# Patient Record
Sex: FEMALE | Race: WHITE | NOT HISPANIC OR LATINO | Employment: PART TIME | URBAN - METROPOLITAN AREA
[De-identification: names, ages, dates, MRNs, and addresses within clinical notes are randomized per-mention and may not be internally consistent; named-entity substitution may affect disease eponyms.]

---

## 2019-02-14 ENCOUNTER — APPOINTMENT (OUTPATIENT)
Dept: GENERAL RADIOLOGY | Facility: CLINIC | Age: 64
End: 2019-02-14
Attending: PHYSICIAN ASSISTANT

## 2019-02-14 ENCOUNTER — HOSPITAL ENCOUNTER (EMERGENCY)
Facility: CLINIC | Age: 64
Discharge: HOME | End: 2019-02-14
Attending: PHYSICIAN ASSISTANT | Admitting: PHYSICIAN ASSISTANT
Payer: COMMERCIAL

## 2019-02-14 VITALS
TEMPERATURE: 97.8 F | DIASTOLIC BLOOD PRESSURE: 75 MMHG | SYSTOLIC BLOOD PRESSURE: 110 MMHG | OXYGEN SATURATION: 98 % | RESPIRATION RATE: 15 BRPM

## 2019-02-14 DIAGNOSIS — M79.641 PAIN OF RIGHT HAND: ICD-10-CM

## 2019-02-14 PROCEDURE — G0463 HOSPITAL OUTPT CLINIC VISIT: HCPCS

## 2019-02-14 PROCEDURE — 73130 X-RAY EXAM OF HAND: CPT | Mod: RT

## 2019-02-14 PROCEDURE — 99213 OFFICE O/P EST LOW 20 MIN: CPT | Mod: Z6 | Performed by: PHYSICIAN ASSISTANT

## 2019-02-14 NOTE — ED AVS SNAPSHOT
[facility] Emergency Department  [address] [address]  Phone:  [phone]  Fax:  [phone]                                    Benjie Bob   MRN: [id number]    Department:  [facility] Emergency Department   Date of Visit:  2/14/2019           After Visit Summary Signature Page    I have received my discharge instructions, and my questions have been answered. I have discussed any challenges I see with this plan with the nurse or doctor.    ..........................................................................................................................................  Patient/Patient Representative Signature      ..........................................................................................................................................  Patient Representative Print Name and Relationship to Patient    ..................................................               ................................................  Date                                   Time    ..........................................................................................................................................  Reviewed by Signature/Title    ...................................................              ..............................................  Date                                               Time          22EPIC Rev 08/18

## 2019-02-14 NOTE — ED PROVIDER NOTES
History     Chief Complaint   Patient presents with     Hand Injury     right, jammed right hand when fell     HPI  Benjie Bob is a 63 year old female who presents to the urgent care with concern over right hand pain after injury after fall 2 days ago.  Patient sustained a fall on the ice while attempting to shovel.  She landed primarily on her buttock however did have impact on her posteriorly outstretched wrist.  She complains of pain, swelling, decreased range of motion of her right thumb.  She denies any distal numbness or paresthesias.  No other areas of significant pain.  She states that she has had progressively worsening pain of joints of fingers for the last several years that she attributes to arthritis.      Allergies:  Allergies   Allergen Reactions     Bees      Benadryl [Altaryl]      Benadryl [Diphenhydramine Hcl]      Codeine      Breathign problems     Codeine Sulfate      Percocet [Oxycodone-Acetaminophen]      Percocet [Oxycodone-Acetaminophen]      Vicodin [Acetaminophen] Hives     Vicodin [Hydrocodone-Acetaminophen]        Problem List:    Patient Active Problem List    Diagnosis Date Noted     MRSA (methicillin resistant staph aureus) culture positive 02/21/2013     Priority: Medium     Cellulitis of hand 10/15/2012     Priority: Medium        Past Medical History:    No past medical history on file.    Past Surgical History:    No past surgical history on file.    Family History:    No family history on file.    Social History:  Marital Status:   [4]  Social History     Tobacco Use     Smoking status: Never Smoker   Substance Use Topics     Alcohol use: No     Drug use: No        Medications:      LISINOPRIL 5 MG OR TABS   Venlafaxine HCl (EFFEXOR PO)     Review of Systems  INTEGUMENTARY/SKIN: POSITIVE for ecchymosis NEGATIVE for lacerations, abrasions or rashes   MUSCULOSKELETAL: POSITIVE  for right hand pain and NEGATIVE for other concerning arthralgias or myalgias    NEURO: NEGATIVE for numbness, paresthesias   Physical Exam   BP: 110/75  Heart Rate: 85  Temp: 97.8  F (36.6  C)  Resp: 15  SpO2: 98 %  Physical Exam   Constitutional: She is oriented to person, place, and time. She appears well-developed and well-nourished. No distress.   Cardiovascular:   Pulses:       Radial pulses are 2+ on the right side.   Musculoskeletal:        Right elbow: Normal.       Right wrist: She exhibits tenderness. She exhibits normal range of motion, no bony tenderness, no swelling, no effusion, no crepitus, no deformity and no laceration.        Right hand: She exhibits tenderness, bony tenderness and swelling. She exhibits normal range of motion (however there is pain with active movement of thumb at MCP joint due to pain), normal capillary refill, no deformity and no laceration.   Neurological: She is alert and oriented to person, place, and time. No sensory deficit. GCS eye subscore is 4. GCS verbal subscore is 5. GCS motor subscore is 6.   Skin: Skin is warm, dry and intact. Ecchymosis noted. No abrasion, no laceration and no rash noted. No erythema.     ED Course        Procedures        Critical Care time:  none        Results for orders placed or performed during the hospital encounter of 02/14/19   XR Hand Right G/E 3 Views    Narrative    RIGHT HAND THREE VIEWS   2/14/2019 1:53 PM     HISTORY: Pain of thumb and second metacarpal after fall two days ago.    COMPARISON: None.      Impression    IMPRESSION: No acute fracture or dislocation. There are severe  degenerative changes at the CMC joint of the thumb. Distal  interphalangeal degenerative changes are also noted across all  fingers.    KIM ARRINGTON MD     Medications - No data to display  Assessments & Plan (with Medical Decision Making)     I have reviewed the nursing notes.  I have reviewed the findings, diagnosis, plan and need for follow up with the patient.        Medication List      There are no discharge medications for  this visit.       Final diagnoses:   Pain of right hand     63-year-old female presents to the urgent care with concern over right hand pain after sustaining a fall on ice two days ago.  She had stable vital signs upon arrival.  Physical exam findings as described above.  As part of evaluation she did have x-ray of her right hand which did not demonstrate any acute fracture dislocation however was noted to have severe degenerative changes at the CMC joint of the thumb and degenerative changes across all fingers at the DIP joints.  She was notified of results of x-ray report.  Differential for her pain would include sprain, contusion, occult fracture. She was placed in thumb spica Velcro wrist splint.  May use symptomatic treatment with rest, ice, ibuprofen/tylenol as tolerated.  She was instructed to follow up for recheck in 3-5 days,  referral placed.  Worrisome reasons to return to ER/UC sooner discussed.      Disclaimer: This note consists of symbols derived from keyboarding, dictation, and/or voice recognition software. As a result, there may be errors in the script that have gone undetected.  Please consider this when interpreting information found in the chart.    2/14/2019   [facility] EMERGENCY DEPARTMENT     Marleni Schofield PA-C  02/18/19 [id number]

## 2020-11-01 ENCOUNTER — APPOINTMENT (OUTPATIENT)
Dept: GENERAL RADIOLOGY | Facility: CLINIC | Age: 65
End: 2020-11-01
Attending: NURSE PRACTITIONER
Payer: MEDICARE

## 2020-11-01 ENCOUNTER — HOSPITAL ENCOUNTER (EMERGENCY)
Facility: CLINIC | Age: 65
Discharge: HOME | End: 2020-11-01
Attending: NURSE PRACTITIONER | Admitting: NURSE PRACTITIONER
Payer: MEDICARE

## 2020-11-01 VITALS
OXYGEN SATURATION: 100 % | TEMPERATURE: 97.8 F | BODY MASS INDEX: 23.39 KG/M2 | HEART RATE: 78 BPM | WEIGHT: 137 LBS | DIASTOLIC BLOOD PRESSURE: 97 MMHG | SYSTOLIC BLOOD PRESSURE: 145 MMHG | HEIGHT: 64 IN | RESPIRATION RATE: 16 BRPM

## 2020-11-01 DIAGNOSIS — J06.9 VIRAL URI WITH COUGH: ICD-10-CM

## 2020-11-01 DIAGNOSIS — M79.645 THUMB PAIN, LEFT: ICD-10-CM

## 2020-11-01 PROCEDURE — 99285 EMERGENCY DEPT VISIT HI MDM: CPT | Mod: 25 | Performed by: NURSE PRACTITIONER

## 2020-11-01 PROCEDURE — 71045 X-RAY EXAM CHEST 1 VIEW: CPT

## 2020-11-01 PROCEDURE — U0003 INFECTIOUS AGENT DETECTION BY NUCLEIC ACID (DNA OR RNA); SEVERE ACUTE RESPIRATORY SYNDROME CORONAVIRUS 2 (SARS-COV-2) (CORONAVIRUS DISEASE [COVID-19]), AMPLIFIED PROBE TECHNIQUE, MAKING USE OF HIGH THROUGHPUT TECHNOLOGIES AS DESCRIBED BY CMS-2020-01-R: HCPCS | Performed by: NURSE PRACTITIONER

## 2020-11-01 PROCEDURE — 99284 EMERGENCY DEPT VISIT MOD MDM: CPT | Performed by: NURSE PRACTITIONER

## 2020-11-01 PROCEDURE — 73140 X-RAY EXAM OF FINGER(S): CPT | Mod: LT

## 2020-11-01 PROCEDURE — C9803 HOPD COVID-19 SPEC COLLECT: HCPCS | Performed by: NURSE PRACTITIONER

## 2020-11-01 ASSESSMENT — ENCOUNTER SYMPTOMS
NEUROLOGICAL NEGATIVE: 1
FEVER: 0
ABDOMINAL PAIN: 0
DIARRHEA: 0
CONSTIPATION: 0
NAUSEA: 0
CHILLS: 0
SHORTNESS OF BREATH: 0
CHEST TIGHTNESS: 0
SORE THROAT: 1
COUGH: 1
VOMITING: 0

## 2020-11-01 ASSESSMENT — MIFFLIN-ST. JEOR: SCORE: 1151.43

## 2020-11-01 NOTE — ED AVS SNAPSHOT
[facility] Emergency Dept  [address] [address]  Phone: [phone]  Fax: [phone]                                    Benjie Bob   MRN: [id number]    Department: [facility] Emergency Dept   Date of Visit: 11/1/2020           After Visit Summary Signature Page    I have received my discharge instructions, and my questions have been answered. I have discussed any challenges I see with this plan with the nurse or doctor.    ..........................................................................................................................................  Patient/Patient Representative Signature      ..........................................................................................................................................  Patient Representative Print Name and Relationship to Patient    ..................................................               ................................................  Date                                   Time    ..........................................................................................................................................  Reviewed by Signature/Title    ...................................................              ..............................................  Date                                               Time          22EPIC Rev 08/18

## 2020-11-01 NOTE — ED PROVIDER NOTES
History     Chief Complaint   Patient presents with     Cough     3 weeks of cough and congestion     Thumb Discomfort     hurt her thumb when tying something this morning      HPI  Benjie Bob is a 65 year old female who presents to the emergency department for evaluation of the following complaints:    Cough: started 2-3 weeks ago. Productive. Increased over the last week. Accompanied by sore throat and nasal congestion. Denies chest pain or shortness of breath.  Denies nausea or vomiting.  Denies constipation or diarrhea.  Denies altered taste or smell.  Denies decreased appetite. Requesting testing for Covid. Nonsmoker. No history of lung disease.    Left thumb injury:  This morning tying something and felt pain in the DIP joint. Unsure if she hyperextended her left thumb. Pain continues. No swelling or redness.     Allergies:  Allergies   Allergen Reactions     Bees      Benadryl [Altaryl]      Benadryl [Diphenhydramine Hcl]      Codeine      Breathign problems     Codeine Sulfate      Percocet [Oxycodone-Acetaminophen]      Percocet [Oxycodone-Acetaminophen]      Vicodin [Acetaminophen] Hives     Vicodin [Hydrocodone-Acetaminophen]        Problem List:    Patient Active Problem List    Diagnosis Date Noted     MRSA (methicillin resistant staph aureus) culture positive 02/21/2013     Priority: Medium     Cellulitis of hand 10/15/2012     Priority: Medium        Past Medical History:    No past medical history on file.    Past Surgical History:    No past surgical history on file.    Family History:    No family history on file.    Social History:  Marital Status:   [4]  Social History     Tobacco Use     Smoking status: Never Smoker   Substance Use Topics     Alcohol use: No     Drug use: No        Medications:         LISINOPRIL 5 MG OR TABS       Venlafaxine HCl (EFFEXOR PO)          Review of Systems   Constitutional: Negative for chills and fever.   HENT: Positive for congestion and sore  "throat.    Respiratory: Positive for cough. Negative for chest tightness and shortness of breath.    Cardiovascular: Negative for chest pain.   Gastrointestinal: Negative for abdominal pain, constipation, diarrhea, nausea and vomiting.   Musculoskeletal:        Left thumb pain   Skin: Negative.    Neurological: Negative.    All other systems reviewed and are negative.      Physical Exam   BP: (!) 145/97  Pulse: 78  Temp: 97.8  F (36.6  C)  Resp: 16  Height: 162.6 cm (5' 4\")  Weight: 62.1 kg (137 lb)  SpO2: 100 %      Physical Exam  Constitutional:       Appearance: Normal appearance.   HENT:      Head: Normocephalic and atraumatic.      Nose: Nose normal.      Mouth/Throat:      Mouth: Mucous membranes are moist.      Pharynx: Oropharynx is clear. Posterior oropharyngeal erythema present.   Eyes:      General: No scleral icterus.     Conjunctiva/sclera: Conjunctivae normal.   Cardiovascular:      Rate and Rhythm: Normal rate and regular rhythm.   Pulmonary:      Effort: Pulmonary effort is normal.      Breath sounds: Normal breath sounds.      Comments: Frequent wheezing sounding cough, but no wheezing on auscultation.  Musculoskeletal:      Comments: Left hand:   --no swelling, deformity or erythema.  --tenderness with palpation to the DIP joint of the left thumb  --normal sensation. Hand and fingers are warm and pink.     Skin:     General: Skin is warm and dry.   Neurological:      General: No focal deficit present.      Mental Status: She is alert and oriented to person, place, and time.         ED Course        Procedures       Results for orders placed or performed during the hospital encounter of 11/01/20 (from the past 24 hour(s))   XR Chest Port 1 View    Narrative    XR CHEST PORT 1 VW 11/1/2020 10:18 AM    HISTORY: cough    COMPARISON: None.      Impression    IMPRESSION: Negative chest.    JEAN CORTEZ MD   XR Finger Port Left G/E 2 Views    Narrative    FINGER PORTABLE LEFT TWO OR MORE VIEWS " 11/1/2020 10:23 AM    HISTORY: Hyperextended. DIP joint pain.    COMPARISON: None available      Impression    IMPRESSION: No acute left thumb fracture or dislocation. Severe left  thumb carpometacarpal joint osteoarthritis with moderate thumb MCP  joint osteoarthritis. Chronic fragmented spur thumb proximal  phalangeal base. No significant left thumb soft tissue swelling.     DONALD ASCENCIO MD       Medications - No data to display    Assessments & Plan (with Medical Decision Making)     65 year old female with 2 weeks of productive cough and congestion. No CP or SOA. No fevers. Additionally patient complains of left thumb pain after tying something today. Unsure if she hyperextended her thumb. Reports pain focal to the DIP joint.  On exam patient is afebrile. Cole sounds CTA. No tachypnea. No hypoxia. No respiratory distress. Left thumb is tender to the DIP joint, no swelling or erythema. CXR is normal. Left thumb xray is negative for acute fracture-notable for osteoarthritis. I discussed the imaging findings with patient. Covid-19 testing obaitned and result is pending. I have low suspicion for pneumonia. Likely a viral process and consistent with bronchitis- no wheezing on exam to warrant steroids. No indication for antibiotics. Patient has no history of lung disease or asthma. Plan  As follows:  Quarantine at home until you know the results of the covid test.   See handout.  Tylenol 650 mg every 4-6 hours as needed for fever/pain. (and for thumb pain)  Stay well hydrated.  Return to the emergency department for worsening fevers, chest pain, short of breath/difficulty breathing, or any new symptoms concern.      I have reviewed the nursing notes.    I have reviewed the findings, diagnosis, plan and need for follow up with the patient.      Discharge Medication List as of 11/1/2020 11:24 AM          Final diagnoses:   Viral URI with cough   Thumb pain, left       11/1/2020   [facility] EMERGENCY  DEPT     Vu, Bibiana Philip, KOKI ISRAEL  11/01/20 2741

## 2020-11-01 NOTE — DISCHARGE INSTRUCTIONS
Quarantine at home until you know the results of the covid test.   See handout.  Tylenol 650 mg every 4-6 hours as needed for fever/pain. (and for thumb pain)  Stay well hydrated.  Return to the emergency department for worsening fevers, chest pain, short of breath/difficulty breathing, or any new symptoms concern.

## 2020-11-02 ENCOUNTER — NURSE TRIAGE (OUTPATIENT)
Dept: NURSING | Facility: CLINIC | Age: 65
End: 2020-11-02

## 2020-11-02 LAB
SARS-COV-2 RNA SPEC QL NAA+PROBE: NOT DETECTED
SPECIMEN SOURCE: NORMAL

## 2020-11-02 NOTE — TELEPHONE ENCOUNTER
Triage call:   Patient calling to check on status of her results- advised on the following. Denies any symptoms today.      Coronavirus (COVID-19) Notification    Lab Result   Lab test 2019-nCoV rRt-PCR OR SARS-COV-2 PCR    Nasopharyngeal AND/OR Oropharyngeal swab is NEGATIVE for 2019-nCoV RNA [OR] SARS-COV-2 RNA (COVID-19) RNA    Your result was negative. This means that we didn't find the virus that causes COVID-19 in your sample. A test may show negative when you do actually have the virus. This can happen when the virus is in the early stages of infection, before you feel illness symptoms.    If you have symptoms   Stay home and away from others (self-isolate) until you meet ALL of the guidelines below:    You've had no fever--and no medicine that reduces fever--for 1 full day (24 hours). And      Your other symptoms have gotten better. For example, your cough or breathing has improved. And   ; At least 10 days have passed since your symptoms started. (If you've been told by a doctor that you have a weak immune system, wait 20 days.)         During this time:    Stay home. Don't go to work, school or anywhere else.     Stay in your own room, including for meals. Use your own bathroom if you can.    Stay away from others in your home. No hugging, kissing or shaking hands. No visitors.    Clean  high touch  surfaces often (doorknobs, counters, handles, etc.). Use a household cleaning spray or wipes. You can find a full list on the EPA website at www.epa.gov/pesticide-registration/list-n-disinfectants-use-against-sars-cov-2.    Cover your mouth and nose with a mask, tissue or other face covering to avoid spreading germs.    Wash your hands and face often with soap and water.    Going back to work  Check with your employer for any guidelines to follow for going back to work.  You are sent a letter for your Employer which will serve as formal document notice that you, the employee, tested negative for COVID-19, as of  the testing date shown above.    If your symptoms worsen or other concerning symptoms, contact PCP, oncare or consider returning to Emergency Dept.    Where can I get more information?     [place]: [email or web address]    Coronavirus Basics: www.health[facility].us/diseases/coronavirus/basics.html    [facility] Hotline (117-798-3886)    {Name]  Dora Bravo RN BSN 11/2/2020 2:51 PM

## 2024-02-01 ENCOUNTER — HOSPITAL ENCOUNTER (EMERGENCY)
Facility: CLINIC | Age: 69
Discharge: HOME | End: 2024-02-01
Attending: NURSE PRACTITIONER | Admitting: NURSE PRACTITIONER
Payer: MEDICARE

## 2024-02-01 VITALS
RESPIRATION RATE: 16 BRPM | OXYGEN SATURATION: 97 % | DIASTOLIC BLOOD PRESSURE: 74 MMHG | TEMPERATURE: 97.4 F | HEART RATE: 89 BPM | SYSTOLIC BLOOD PRESSURE: 121 MMHG

## 2024-02-01 DIAGNOSIS — L08.9 WOUND INFECTION: ICD-10-CM

## 2024-02-01 DIAGNOSIS — T14.8XXA WOUND INFECTION: ICD-10-CM

## 2024-02-01 PROCEDURE — 10060 I&D ABSCESS SIMPLE/SINGLE: CPT | Performed by: NURSE PRACTITIONER

## 2024-02-01 PROCEDURE — 87070 CULTURE OTHR SPECIMN AEROBIC: CPT | Performed by: NURSE PRACTITIONER

## 2024-02-01 PROCEDURE — G0463 HOSPITAL OUTPT CLINIC VISIT: HCPCS | Mod: 25 | Performed by: NURSE PRACTITIONER

## 2024-02-01 PROCEDURE — 99203 OFFICE O/P NEW LOW 30 MIN: CPT | Mod: 25 | Performed by: NURSE PRACTITIONER

## 2024-02-01 RX ORDER — SULFAMETHOXAZOLE/TRIMETHOPRIM 800-160 MG
1 TABLET ORAL 2 TIMES DAILY
Qty: 14 TABLET | Refills: 0 | Status: SHIPPED | OUTPATIENT
Start: 2024-02-01 | End: 2024-02-08

## 2024-02-01 NOTE — DISCHARGE INSTRUCTIONS
Recommend cleaning with antibacterial soap twice daily changing dressing twice daily ordered antibiotics recommend finishing all of these as ordered.  A culture was sent we will contact you if the antibiotics need to be changed.  I will be placing a podiatry consult and they will contact you.

## 2024-02-02 NOTE — RESULT ENCOUNTER NOTE
[facility] Emergency Dept discharge antibiotic prescribed: Sulfamethoxazole-Trimethoprim (Bactrim DS, Septra DS) 800-160 mg PO tablet,  1 tablet by mouth 2 times daily for 7 days.  Recommendations in treatment per [facility] ED Lab Result culture protocol

## 2024-02-03 NOTE — ED PROVIDER NOTES
ED Provider Note  [facility]      History     Chief Complaint   Patient presents with    Foot Problems     HPI  Benjie Bob is a 68 year old female who presents with pus filled blister on her left third toe.  Denies any fever, chills.  Reports that she has a sore between her right fourth and fifth toe that has become very painful.  Denies any history of diabetes.  Patient is seen by primary provider through [facility] clinics in [place].     Reports that she has had past infection with MRSA, treated with antibiotics.            Allergies:  Allergies   Allergen Reactions    Bees     Benadryl [Diphenhydramine Hcl]     Benadryl [Diphenhydramine Hcl]     Codeine Sulfate     Morphine And Related      Breathign problems    Percocet [Oxycodone-Acetaminophen]     Percocet [Oxycodone-Acetaminophen]     Vicodin [Acetaminophen] Hives    Vicodin [Hydrocodone-Acetaminophen]        Problem List:    Patient Active Problem List    Diagnosis Date Noted    MRSA (methicillin resistant staph aureus) culture positive 02/21/2013     Priority: Medium    Cellulitis of hand 10/15/2012     Priority: Medium        Past Medical History:    No past medical history on file.    Past Surgical History:    No past surgical history on file.    Family History:    No family history on file.    Social History:  Marital Status:   [4]  Social History     Tobacco Use    Smoking status: Never   Substance Use Topics    Alcohol use: No    Drug use: No        Medications:    LISINOPRIL 5 MG OR TABS  sulfamethoxazole-trimethoprim (BACTRIM DS) 800-160 MG tablet  Venlafaxine HCl (EFFEXOR PO)          Review of Systems  A medically appropriate review of systems was performed with pertinent positives and negatives noted in the HPI, and all other systems negative.    Physical Exam   No data found.       Physical Exam  General: alert and in no acute distress on arrival  Head: atraumatic, normocephalic  Lungs:  nonlabored,  CTA bilateral throughout  CV: Regular rate and rhythm, extremities warm and perfused, no edema in lower extremities.  Skin: no rashes, no diaphoresis and skin color normal, has a pus filled abscessed/ blister area approximately 3 cm round on top of her left third toe.  Also has a thickened red and white area between her fourth and fifth toe on her right foot which she reports is painful.  Skin around both of these is reddened and slightly thickened.  Neuro: Patient awake, alert, speech is fluent, appropriate historian.  Psychiatric: affect/mood normal, pleasant.      ED Course            Recommend cleaning with antibacterial soap twice daily changing dressing twice daily ordered antibiotics recommend finishing all of these as ordered.  A culture was sent we will contact you if the antibiotics need to be changed.  I will be placing a podiatry consult and they will contact you.     [facility]    PROCEDURE: -Incision/Drainage    Date/Time: 2/2/2024 8:41 PM    Performed by: Bisi Taylor APRN CNP  Authorized by: Bisi Taylor APRN CNP    Risks, benefits and alternatives discussed.      LOCATION:      Type:  Abscess    Size:  3 cm round    Location:  Lower extremity    Lower extremity location:  Foot    Foot location:  L foot    PRE-PROCEDURE DETAILS:     Skin preparation:  Antiseptic wash and Chloraprep    PROCEDURE TYPE:     Complexity:  Simple    ANESTHESIA (see MAR for exact dosages):     Anesthesia method:  Local infiltration    Local anesthetic:  Bupivacaine 0.25% w/o epi    PROCEDURE DETAILS:     Needle aspiration: yes      Needle size:  18 G    Wound management:  Probed and deloculated and irrigated with saline    Drainage:  Purulent and serosanguinous    Drainage amount:  Moderate    Wound treatment:  Wound left open    PROCEDURE  Describe Procedure: Needle aspiration of abscess bulla on left third toe, moderate amount of purulent serosanguineous drainage present this was  cultured and sent to the lab.  Patient Tolerance:  Patient tolerated the procedure well with no immediate complications                  No results found for this or any previous visit (from the past 24 hour(s)).    MEDICATIONS GIVEN IN THE EMERGENCY DEPARTMENT:  Medications - No data to display             Assessments & Plan (with Medical Decision Making)  68 year old female who presents to the Urgent Care for evaluation of wound infection of left third toe, ordered Bactrim DS twice daily for 7 days.  Ortho/podiatry  consult ordered.  Patient advised that they will contact her to schedule instructed to finish all of oral antibiotics ordered, also advised that she may be contacted regarding culture results if antibiotic needs to be changed.  Chart reviewed she has a history of MRSA, Bactrim DS will treat MRSA however Gram stain and culture are pending.       I have reviewed the nursing notes.    I have reviewed the findings, diagnosis, plan and need for follow up with the patient.        NEW PRESCRIPTIONS STARTED AT TODAY'S ER VISIT  Discharge Medication List as of 2/1/2024  4:35 PM        START taking these medications    Details   sulfamethoxazole-trimethoprim (BACTRIM DS) 800-160 MG tablet Take 1 tablet by mouth 2 times daily for 7 days, Disp-14 tablet, R-0, E-Prescribe             Final diagnoses:   Wound infection       2/1/2024   [facility] EMERGENCY DEPT       Bisi Taylor APRN CNP  02/02/24 2047

## 2024-02-04 LAB
BACTERIA ABSC ANAEROBE+AEROBE CULT: ABNORMAL
BACTERIA ABSC ANAEROBE+AEROBE CULT: ABNORMAL
GRAM STAIN RESULT: ABNORMAL
GRAM STAIN RESULT: ABNORMAL

## 2024-02-05 ENCOUNTER — TELEPHONE (OUTPATIENT)
Dept: EMERGENCY MEDICINE | Facility: CLINIC | Age: 69
End: 2024-02-05
Payer: MEDICARE

## 2024-02-05 NOTE — TELEPHONE ENCOUNTER
[facility]    Reason for call: Lab Result Notification     Lab Result (including Rx patient on, if applicable).  If culture, copy of lab report at bottom.  Lab Result: Final Abscess Aerobic Bacterial Culture (specimen - Foot, Right; Abscess) report on 2/4/24  [facility] discharge antibiotic prescribed: Sulfamethoxazole-Trimethoprim (Bactrim DS, Septra DS) 800-160 mg PO tablet,  1 tablet by mouth 2 times daily for 7 days.  #1. Bacteria,  Staphylococcus lugdunensis,  is [SUSCEPTIBLE] to antibiotic.  Incision and Drainage performed in the [place] ED: YES  Recommendations in treatment per [facility] ED lab result culture protocol    Creatinine Level (mg/dl)   Creatinine   Date Value Ref Range Status   08/15/2014 1.23 (H) 0.52 - 1.04 mg/dL Final    Creatinine clearance (ml/min), if applicable    Creatinine clearance cannot be calculated (Patient's most recent lab result is older than the maximum 365 days allowed.)     Patient's current Symptoms:   Call attempt no answer, unable to leave Sunday Powell RN